# Patient Record
Sex: MALE | URBAN - METROPOLITAN AREA
[De-identification: names, ages, dates, MRNs, and addresses within clinical notes are randomized per-mention and may not be internally consistent; named-entity substitution may affect disease eponyms.]

---

## 2018-07-27 ENCOUNTER — OFFICE VISIT (OUTPATIENT)
Dept: URBAN - METROPOLITAN AREA CLINIC 48 | Facility: CLINIC | Age: 68
End: 2018-07-27
Payer: MEDICARE

## 2018-07-27 DIAGNOSIS — H43.11 VITREOUS HEMORRHAGE, RIGHT EYE: ICD-10-CM

## 2018-07-27 DIAGNOSIS — H40.52X0 GLAUCOMA OF LEFT EYE SECONDARY TO OTHER EYE DISORDER: ICD-10-CM

## 2018-07-27 PROCEDURE — 67028 INJECTION EYE DRUG: CPT | Performed by: OPHTHALMOLOGY

## 2018-07-27 PROCEDURE — 99213 OFFICE O/P EST LOW 20 MIN: CPT | Performed by: OPHTHALMOLOGY

## 2018-07-27 RX ORDER — LATANOPROST 50 UG/ML
0.005 % SOLUTION OPHTHALMIC
Qty: 1 | Refills: 2 | Status: INACTIVE
Start: 2018-07-27 | End: 2018-11-14

## 2018-07-27 RX ORDER — BRIMONIDINE TARTRATE, TIMOLOL MALEATE 2; 5 MG/ML; MG/ML
SOLUTION/ DROPS OPHTHALMIC
Qty: 1 | Refills: 2 | Status: INACTIVE
Start: 2018-07-27 | End: 2018-10-25

## 2018-07-27 ASSESSMENT — INTRAOCULAR PRESSURE
OD: 36
OS: 19

## 2018-07-27 NOTE — IMPRESSION/PLAN
Impression: Vitreous hemorrhage, right eye: H43.11. Plan: OCT ordered and performed today. Discussed diagnosis with patient. The clinical exam is consistent with Vitreous Hemorrhage. I have reviewed treatment options with the patient including observation vs Vitrectomy sx. After a through discussion of surgical R/B/A, the patient chose's to observe at this time. I discussed upright positioning and elevation of the head while sleeping. If the hemorrhage fails to spontaneously resolve or worsens, we will revisit the possibility of Vitrectomy at the follow up visit. Patient agrees with plan.

## 2018-07-27 NOTE — IMPRESSION/PLAN
Impression: Type 2 diabetes mellitus w/ proliferative diabetic retinopathy w/ macular edema, bilateral: P62.8221. Plan: OCT ordered and performed today. Discussed diagnosis in detail with patient. Discussed treatment options with patient. Discussed risks and benefits and patient understands. Start a one time injection in both eyes today. Discussed with patient possible laser in the left eye. Patient will see Dr. Will Barclay on Monday for NVG consult.

## 2018-07-27 NOTE — IMPRESSION/PLAN
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 NVG Plan: Discussed diagnosis in detail with patient. Discussed risks and benefits and patient understands. New medication(s) Rx given today. Consult recommended with Dr. Blair Broderick for NVG. Started patient on [place] and latanoprost, Rx sent to pharm.

## 2018-07-30 ENCOUNTER — OFFICE VISIT (OUTPATIENT)
Dept: URBAN - METROPOLITAN AREA CLINIC 48 | Facility: CLINIC | Age: 68
End: 2018-07-30
Payer: MEDICARE

## 2018-07-30 PROCEDURE — 76514 ECHO EXAM OF EYE THICKNESS: CPT | Performed by: OPHTHALMOLOGY

## 2018-07-30 PROCEDURE — 92014 COMPRE OPH EXAM EST PT 1/>: CPT | Performed by: OPHTHALMOLOGY

## 2018-07-30 PROCEDURE — 92020 GONIOSCOPY: CPT | Performed by: OPHTHALMOLOGY

## 2018-07-30 ASSESSMENT — INTRAOCULAR PRESSURE
OD: 37
OS: 13

## 2018-07-30 NOTE — IMPRESSION/PLAN
Impression: Other specified glaucoma: H40.89. Plan: Discussed diagnosis in detail with patient. Discussed treatment options with patient. IOP elevated in right eye. Discussed with patient that he needs to have good blood sugar control. Patient expressed understanding. Discussed with patient that he is to use pressure gtts to right eye not left eye. Patient to use Combigan bid od and Latanoprost qhs od. If IOP is not controlled with pressure drops then he might need a glaucoma surgery.  

RTC
this thursday

## 2018-08-02 ENCOUNTER — OFFICE VISIT (OUTPATIENT)
Dept: URBAN - METROPOLITAN AREA CLINIC 48 | Facility: CLINIC | Age: 68
End: 2018-08-02
Payer: MEDICARE

## 2018-08-02 PROCEDURE — 92012 INTRM OPH EXAM EST PATIENT: CPT | Performed by: OPHTHALMOLOGY

## 2018-08-02 RX ORDER — DORZOLAMIDE HCL 20 MG/ML
2 % SOLUTION/ DROPS OPHTHALMIC
Qty: 1 | Refills: 3 | Status: INACTIVE
Start: 2018-08-02 | End: 2018-10-25

## 2018-08-02 ASSESSMENT — INTRAOCULAR PRESSURE
OS: 11
OD: 27

## 2018-08-02 NOTE — IMPRESSION/PLAN
Impression: Other specified glaucoma: H40.89. Plan: IOP has improved since medication change, Medication instillation reviewed and understood. Change medication(s). Patient to start medications to lower ocular pressure, all potential side effects and benefits of Dorzolamide discussed. Patient made aware that failure to use this medication may result in Glaucoma progression. Patient to start Dorzolamide bid od, & continue with Combigan bid od and Latanoprost qhs od.

## 2018-08-08 ENCOUNTER — OFFICE VISIT (OUTPATIENT)
Dept: URBAN - METROPOLITAN AREA CLINIC 48 | Facility: CLINIC | Age: 68
End: 2018-08-08
Payer: MEDICARE

## 2018-08-08 PROCEDURE — 92014 COMPRE OPH EXAM EST PT 1/>: CPT | Performed by: OPHTHALMOLOGY

## 2018-08-08 RX ORDER — PREDNISOLONE ACETATE 10 MG/ML
1 % SUSPENSION/ DROPS OPHTHALMIC
Qty: 1 | Refills: 3 | Status: INACTIVE
Start: 2018-08-08 | End: 2018-10-25

## 2018-08-08 ASSESSMENT — INTRAOCULAR PRESSURE
OS: 15
OD: 15

## 2018-08-08 NOTE — IMPRESSION/PLAN
Impression: Other specified glaucoma: H40.89. Plan: IOP improved, continue current treatment. Add predforte bid OD for intraocular inflammation. IOP check in 1 week with Dr Francisco Goldsmith and then 3-4 weeks with me.

## 2018-08-15 ENCOUNTER — OFFICE VISIT (OUTPATIENT)
Dept: URBAN - METROPOLITAN AREA CLINIC 48 | Facility: CLINIC | Age: 68
End: 2018-08-15
Payer: MEDICARE

## 2018-08-15 PROCEDURE — 92012 INTRM OPH EXAM EST PATIENT: CPT | Performed by: OPHTHALMOLOGY

## 2018-08-15 ASSESSMENT — INTRAOCULAR PRESSURE
OS: 15
OD: 11

## 2018-08-15 NOTE — IMPRESSION/PLAN
Impression: Other specified glaucoma: H40.89. Plan: Patient to restart PF QD OD with punctal occlusion Continue  dorzolomide bid OD
- combigan bid OD
- latanoprost qhs OD If swelling continues and or symptoms get worse, neck swelling and tenderness patient needs to go to ER. Follow up with Dr. Bridget Leblanc on Monday. 

RTC 2 weeks IOP check Dr. Brianne Christian

## 2018-10-10 ENCOUNTER — OFFICE VISIT (OUTPATIENT)
Dept: URBAN - METROPOLITAN AREA CLINIC 48 | Facility: CLINIC | Age: 68
End: 2018-10-10
Payer: MEDICARE

## 2018-10-10 DIAGNOSIS — E11.3492 TYPE 2 DIAB W SEVERE NONPRLF DIAB RTNOP W/O MACULAR EDEMA, LEFT EYE: ICD-10-CM

## 2018-10-10 PROCEDURE — 92134 CPTRZ OPH DX IMG PST SGM RTA: CPT | Performed by: OPHTHALMOLOGY

## 2018-10-10 PROCEDURE — 99213 OFFICE O/P EST LOW 20 MIN: CPT | Performed by: OPHTHALMOLOGY

## 2018-10-10 ASSESSMENT — INTRAOCULAR PRESSURE
OS: 16
OD: 24

## 2018-10-10 NOTE — IMPRESSION/PLAN
Impression: Type 2 diab w severe nonprlf diab rtnop w/o macular edema, left eye: e11.3492. Plan: OCT ordered and performed today. Discussed diagnosis with patient. The clinical exam was consistent with Type 2 diabetes. The patient was advised to maintain tight blood sugar control, blood pressure and lipid control. Recommend patient follow up in 2-3 mths with [facility]. Patient was also advised to keep all appointments with PCP for diabetic evaluation and counseling to avoid the systemic complications of diabetes.

## 2018-10-10 NOTE — IMPRESSION/PLAN
Impression: Other specified glaucoma: H40.89. Plan: OCT ordered andperformed today, Discussed dx W/ pt, Recommend pt continue W/  latanoprost qhs OD Rx sent to pt's pharm, Recommend pt continue W/ Dr Ezio Solis as schd. Pt states he is confussed as to treatment and gtts. Advised pt to keep appts and to further discuss in detail W/ Dr Ezio Solis. Pt arees W/ plan

## 2018-10-25 ENCOUNTER — OFFICE VISIT (OUTPATIENT)
Dept: URBAN - METROPOLITAN AREA CLINIC 48 | Facility: CLINIC | Age: 68
End: 2018-10-25
Payer: MEDICARE

## 2018-10-25 PROCEDURE — 92012 INTRM OPH EXAM EST PATIENT: CPT | Performed by: OPHTHALMOLOGY

## 2018-10-25 ASSESSMENT — INTRAOCULAR PRESSURE
OD: 27
OS: 17

## 2018-10-25 NOTE — IMPRESSION/PLAN
Impression: Other specified glaucoma: H40.89. Plan: IOP elevated today to right eye, patient to switch Latanoprost from os to od today. Will continue to montior IOP, we may be adding some more eye drops if IOP is not controlled to right eye.

## 2018-11-14 ENCOUNTER — OFFICE VISIT (OUTPATIENT)
Dept: URBAN - METROPOLITAN AREA CLINIC 48 | Facility: CLINIC | Age: 68
End: 2018-11-14
Payer: MEDICARE

## 2018-11-14 PROCEDURE — 92012 INTRM OPH EXAM EST PATIENT: CPT | Performed by: OPHTHALMOLOGY

## 2018-11-14 RX ORDER — LATANOPROST 50 UG/ML
0.005 % SOLUTION OPHTHALMIC
Qty: 1 | Refills: 4 | Status: INACTIVE
Start: 2018-11-14 | End: 2019-02-13

## 2018-11-14 ASSESSMENT — INTRAOCULAR PRESSURE
OS: 17
OD: 20

## 2018-11-14 NOTE — IMPRESSION/PLAN
Impression: Other specified glaucoma: H40.89. Plan: IOP improved today to right eye, patient to continue Latanoprost qhs od. Will continue to montior IOP.

## 2018-12-14 ENCOUNTER — OFFICE VISIT (OUTPATIENT)
Dept: URBAN - METROPOLITAN AREA CLINIC 48 | Facility: CLINIC | Age: 68
End: 2018-12-14
Payer: MEDICARE

## 2018-12-14 DIAGNOSIS — H40.89 OTHER SPECIFIED GLAUCOMA: ICD-10-CM

## 2018-12-14 PROCEDURE — 99213 OFFICE O/P EST LOW 20 MIN: CPT | Performed by: OPHTHALMOLOGY

## 2018-12-14 PROCEDURE — 92134 CPTRZ OPH DX IMG PST SGM RTA: CPT | Performed by: OPHTHALMOLOGY

## 2018-12-14 PROCEDURE — 67028 INJECTION EYE DRUG: CPT | Performed by: OPHTHALMOLOGY

## 2018-12-14 ASSESSMENT — INTRAOCULAR PRESSURE
OD: 20
OS: 19

## 2018-12-14 NOTE — IMPRESSION/PLAN
Impression: Type 2 diab w moderate nonprlf diab rtnop w macular edema, left eye: L06.6067. OS. Plan: OCT ordered and performed today. Discussed diagnosis in detail with patient. Discussed treatment options with patient. Discussed risks and benefits and patient understands. Recommend Avastin OS,  A 3 month series of  Intirivitreal injection's, 1 EVERY MONTH  #1 today then #2 in 1 month, #3 in 2 month's, Then perform DE/OCT in 3 month's.

## 2018-12-14 NOTE — IMPRESSION/PLAN
Impression: Other specified glaucoma: H40.89. Plan: patient to continue Latanoprost qhs od.  Keep all appts W/ Dr Toshia Oconnor

## 2019-01-24 ENCOUNTER — OFFICE VISIT (OUTPATIENT)
Dept: URBAN - METROPOLITAN AREA CLINIC 48 | Facility: CLINIC | Age: 69
End: 2019-01-24
Payer: MEDICARE

## 2019-01-24 PROCEDURE — 99214 OFFICE O/P EST MOD 30 MIN: CPT | Performed by: OPHTHALMOLOGY

## 2019-01-24 PROCEDURE — 92134 CPTRZ OPH DX IMG PST SGM RTA: CPT | Performed by: OPHTHALMOLOGY

## 2019-01-24 PROCEDURE — 67028 INJECTION EYE DRUG: CPT | Performed by: OPHTHALMOLOGY

## 2019-01-24 ASSESSMENT — INTRAOCULAR PRESSURE
OD: 19
OS: 15

## 2019-01-24 NOTE — IMPRESSION/PLAN
Impression: Type 2 diabetes mellitus w/ proliferative diabetic retinopathy w/ macular edema, left eye: E11.3512 OS. Plan: Will proceed with #2/3 TIFFANIE today. R/B/A d/w pt. Consent obtained.  
F/u 4 wks w/Debeus for TIFFANIE #3/3

## 2019-02-13 ENCOUNTER — OFFICE VISIT (OUTPATIENT)
Dept: URBAN - METROPOLITAN AREA CLINIC 48 | Facility: CLINIC | Age: 69
End: 2019-02-13
Payer: MEDICARE

## 2019-02-13 PROCEDURE — 92012 INTRM OPH EXAM EST PATIENT: CPT | Performed by: OPHTHALMOLOGY

## 2019-02-13 RX ORDER — LATANOPROST 50 UG/ML
0.005 % SOLUTION OPHTHALMIC
Qty: 1 | Refills: 7 | Status: INACTIVE
Start: 2019-02-13 | End: 2019-04-30

## 2019-02-13 ASSESSMENT — INTRAOCULAR PRESSURE
OD: 21
OS: 18

## 2019-02-13 NOTE — IMPRESSION/PLAN
Impression: Other specified glaucoma: H40.89. Plan: Discussed and reviewed diagnosis with patient today, understood by patient, intraocular pressure stable with medication. Continue medications and observe. Importance of compliance with medications and regular follow-up reiterated, will continue to monitor. Patient to continue latanoprost qhs OD only. 

IOP check in 3 months VF 24-2/OCT ON same visit OS

## 2019-02-27 ENCOUNTER — PROCEDURE (OUTPATIENT)
Dept: URBAN - METROPOLITAN AREA CLINIC 48 | Facility: CLINIC | Age: 69
End: 2019-02-27
Payer: MEDICARE

## 2019-02-27 DIAGNOSIS — E11.3312 TYPE 2 DIAB WITH MOD NONP RTNOP WITH MACULAR EDEMA, L EYE: Primary | ICD-10-CM

## 2019-02-27 PROCEDURE — 67028 INJECTION EYE DRUG: CPT | Performed by: OPHTHALMOLOGY

## 2019-03-27 ENCOUNTER — OFFICE VISIT (OUTPATIENT)
Dept: URBAN - METROPOLITAN AREA CLINIC 48 | Facility: CLINIC | Age: 69
End: 2019-03-27
Payer: MEDICARE

## 2019-03-27 DIAGNOSIS — E11.3512 TYPE 2 DIABETES MELLITUS W/ PROLIFERATIVE DIABETIC RETINOPATHY W/ MACULAR EDEMA, LEFT EYE: ICD-10-CM

## 2019-03-27 PROCEDURE — 99213 OFFICE O/P EST LOW 20 MIN: CPT | Performed by: OPHTHALMOLOGY

## 2019-03-27 PROCEDURE — 92134 CPTRZ OPH DX IMG PST SGM RTA: CPT | Performed by: OPHTHALMOLOGY

## 2019-03-27 ASSESSMENT — INTRAOCULAR PRESSURE
OD: 24
OS: 14

## 2019-03-27 NOTE — IMPRESSION/PLAN
Impression: Corneal edema: H18.20. Plan: Discussed diagnosis in detail with patient. Discussed treatment options with patient.  Recommend appt W/ Dr. Morgan Estimable for corneal eval.

## 2019-03-27 NOTE — IMPRESSION/PLAN
Impression: Type 2 diabetes mellitus w/ proliferative diabetic retinopathy w/ macular edema, left eye: E11.3512 OS. Plan: Discussed diagnosis in detail with patient. No treatment is required at this time. Reassured patient of current condition and treatment. Will continue to observe condition and or symptoms. Emphasized blood sugar control. Discussed risks of progression.

## 2019-04-30 ENCOUNTER — OFFICE VISIT (OUTPATIENT)
Dept: URBAN - METROPOLITAN AREA CLINIC 48 | Facility: CLINIC | Age: 69
End: 2019-04-30
Payer: MEDICARE

## 2019-04-30 DIAGNOSIS — H18.20 CORNEAL EDEMA: Primary | ICD-10-CM

## 2019-04-30 PROCEDURE — 92012 INTRM OPH EXAM EST PATIENT: CPT | Performed by: OPHTHALMOLOGY

## 2019-04-30 RX ORDER — BRIMONIDINE TARTRATE 2 MG/ML
0.2 % SOLUTION/ DROPS OPHTHALMIC
Qty: 1 | Refills: 3 | Status: INACTIVE
Start: 2019-04-30 | End: 2020-03-20

## 2019-04-30 RX ORDER — PREDNISOLONE ACETATE 10 MG/ML
1 % SUSPENSION/ DROPS OPHTHALMIC
Qty: 1 | Refills: 3 | Status: INACTIVE
Start: 2019-04-30 | End: 2020-03-20

## 2019-04-30 ASSESSMENT — INTRAOCULAR PRESSURE
OS: 14
OD: 18

## 2019-04-30 NOTE — IMPRESSION/PLAN
Impression: Corneal edema: H18.20. Underlying KPs on endothelium only where the edema is at and [facility] reaction suggestive that the edema is secondary to inflammation. Given appearance do not suspect that this represents a direct auto-immune related K condition such as PUK, as there is no infiltrate and not ulceration. Plan: Discussed diagnosis in detail with patient. Restart Prednisolone QID OD [place] 128 5% ointment at night OD
stop Latanoprost do to may cause inflammation Start Brimonidine bid OD 

RTC 10 days  with IOP check and AC check. Photo to be taken today.

## 2019-06-03 ENCOUNTER — OFFICE VISIT (OUTPATIENT)
Dept: URBAN - METROPOLITAN AREA CLINIC 48 | Facility: CLINIC | Age: 69
End: 2019-06-03
Payer: MEDICARE

## 2019-06-03 DIAGNOSIS — E11.3392 TYPE 2 DIAB W MODERATE NONPRLF DIAB RTNOP W/O MACULAR EDEMA, LEFT EYE: ICD-10-CM

## 2019-06-03 DIAGNOSIS — H35.372 PUCKERING OF MACULA, LEFT EYE: ICD-10-CM

## 2019-06-03 PROCEDURE — 92083 EXTENDED VISUAL FIELD XM: CPT | Performed by: OPHTHALMOLOGY

## 2019-06-03 PROCEDURE — 92014 COMPRE OPH EXAM EST PT 1/>: CPT | Performed by: OPHTHALMOLOGY

## 2019-06-03 PROCEDURE — 92133 CPTRZD OPH DX IMG PST SGM ON: CPT | Performed by: OPHTHALMOLOGY

## 2019-06-03 ASSESSMENT — INTRAOCULAR PRESSURE
OD: 15
OS: 15

## 2019-06-03 NOTE — IMPRESSION/PLAN
Impression: Other specified glaucoma: H40.89. Plan: Discussed and reviewed diagnosis with patient today, understood by patient, discussed reviewed VF and OCT with patient today, intraocular pressure stable with medication. Continue medications and observe. Importance of compliance with medications and regular follow-up reiterated, will continue to monitor. Patient to continue Brimonidine bid od, & taper Pred forte 3x2x1 od then d/c.

## 2019-06-03 NOTE — IMPRESSION/PLAN
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 Plan: Diabetes type II: moderate background diabetic retinopathy, no signs of neovascularization noted. Discussed ocular and systemic benefits of maintaining blood sugar control. Patient to continue care with Dr Corrinne Oliva for diabetes.

## 2019-06-03 NOTE — IMPRESSION/PLAN
Impression: Combined forms of age-related cataract, left eye: H25.812. Plan: Progressive deterioration of vision in left eye. Has been getting intravitreal avastin and diabetic macular edema seems to have stabilized. Visual deterioration is likely secondary to progression of cataract and patient likely to benefit from cataract extraction. Patient understands risks of the surgery with worst case scenario of total loss of eye. Other pertinent risk is of reactivation of diabetic maculopathy that may require further treatment. Cataract surgery and the associated risks, benefits, alternatives, expectations, and recovery were discussed in detail with the patient. All questions were answered. The patient desires cataract surgery in left eye. RL 2  Patient desires standard lens for distance target. Patient is to start Combo drops Pred-Gati-Nepaf QID to operative eye one day prior to surgery, and continue after surgery. Schedule cataract surgery in left eye only.

## 2019-07-22 ENCOUNTER — TESTING ONLY (OUTPATIENT)
Dept: URBAN - METROPOLITAN AREA CLINIC 48 | Facility: CLINIC | Age: 69
End: 2019-07-22
Payer: MEDICARE

## 2019-07-22 DIAGNOSIS — H25.812 COMBINED FORMS OF AGE-RELATED CATARACT, LEFT EYE: Primary | ICD-10-CM

## 2019-07-22 PROCEDURE — 92136 OPHTHALMIC BIOMETRY: CPT | Performed by: OPHTHALMOLOGY

## 2019-07-22 ASSESSMENT — PACHYMETRY
OS: 3.10
OS: 23.89
OD: 4.15
OD: 23.72

## 2019-07-23 RX ORDER — KETOROLAC TROMETHAMINE 5 MG/ML
0.5 % SOLUTION OPHTHALMIC
Qty: 1 | Refills: 2 | Status: INACTIVE
Start: 2019-07-23 | End: 2020-03-20

## 2019-07-30 ENCOUNTER — SURGERY (OUTPATIENT)
Dept: URBAN - METROPOLITAN AREA SURGERY 26 | Facility: SURGERY | Age: 69
End: 2019-07-30
Payer: MEDICARE

## 2019-07-30 ENCOUNTER — POST-OPERATIVE VISIT (OUTPATIENT)
Dept: URBAN - METROPOLITAN AREA CLINIC 48 | Facility: CLINIC | Age: 69
End: 2019-07-30

## 2019-07-30 PROCEDURE — 66984 XCAPSL CTRC RMVL W/O ECP: CPT | Performed by: OPHTHALMOLOGY

## 2019-07-30 PROCEDURE — 99024 POSTOP FOLLOW-UP VISIT: CPT | Performed by: OPHTHALMOLOGY

## 2019-07-30 ASSESSMENT — INTRAOCULAR PRESSURE
OS: 22
OS: 22

## 2019-08-06 ENCOUNTER — POST-OPERATIVE VISIT (OUTPATIENT)
Dept: URBAN - METROPOLITAN AREA CLINIC 48 | Facility: CLINIC | Age: 69
End: 2019-08-06
Payer: MEDICARE

## 2019-08-06 PROCEDURE — 99024 POSTOP FOLLOW-UP VISIT: CPT | Performed by: OPTOMETRIST

## 2019-08-06 ASSESSMENT — INTRAOCULAR PRESSURE: OS: 16

## 2019-09-17 ENCOUNTER — POST-OPERATIVE VISIT (OUTPATIENT)
Dept: URBAN - METROPOLITAN AREA CLINIC 48 | Facility: CLINIC | Age: 69
End: 2019-09-17
Payer: MEDICARE

## 2019-09-17 PROCEDURE — 99024 POSTOP FOLLOW-UP VISIT: CPT | Performed by: OPTOMETRIST

## 2019-09-17 ASSESSMENT — INTRAOCULAR PRESSURE
OS: 12
OD: 22

## 2019-09-18 ENCOUNTER — OFFICE VISIT (OUTPATIENT)
Dept: URBAN - METROPOLITAN AREA CLINIC 48 | Facility: CLINIC | Age: 69
End: 2019-09-18
Payer: MEDICARE

## 2019-09-18 DIAGNOSIS — E11.3591 TYPE 2 DIABETES MELLITUS W/ PROLIFERATIVE DIABETIC RETINOPATHY W/O MACULAR EDEMA, RIGHT EYE: ICD-10-CM

## 2019-09-18 PROCEDURE — 92134 CPTRZ OPH DX IMG PST SGM RTA: CPT | Performed by: OPHTHALMOLOGY

## 2019-09-18 PROCEDURE — 99213 OFFICE O/P EST LOW 20 MIN: CPT | Performed by: OPHTHALMOLOGY

## 2019-09-18 PROCEDURE — 67028 INJECTION EYE DRUG: CPT | Performed by: OPHTHALMOLOGY

## 2019-09-18 ASSESSMENT — INTRAOCULAR PRESSURE
OD: 32
OS: 19

## 2019-09-18 NOTE — IMPRESSION/PLAN
Impression: Type 2 diabetes mellitus w/ proliferative diabetic retinopathy w/o macular edema, right eye: e11.3591. OD. Plan: OCT ordered and performed today. The clinical exam is consistent with proliferative diabetic retinopathy. Discussed diagnosis with patient. Recommend close observation at this time.

## 2019-09-18 NOTE — IMPRESSION/PLAN
Impression: Other specified glaucoma: H40.89. OU. Plan: Recommend starting Latanoprost QHS OU. Discussed with patient the IOP's were elevated today.

## 2019-09-18 NOTE — IMPRESSION/PLAN
Impression: Type 2 diab w severe nonprlf diabetic rtnop w macular edema, left eye: F97.3220. OS. Plan: OCT ordered and performed today. Discussed diagnosis in detail with patient. Discussed treatment options with patient. Discussed risks and benefits and patient understands. A 3 month series of Avastin Intravitreal injection's in the left eye, 1 EVERY MONTH  #1 today then #2 in 1 month, #3 in 2 month's, Then perform DE/OCT in 3 month's.

## 2019-10-18 ENCOUNTER — PROCEDURE (OUTPATIENT)
Dept: URBAN - METROPOLITAN AREA CLINIC 48 | Facility: CLINIC | Age: 69
End: 2019-10-18
Payer: MEDICARE

## 2019-10-18 DIAGNOSIS — E11.3412 TYPE 2 DIAB WITH SEVERE NONP RTNOP WITH MACULAR EDEMA, L EYE: Primary | ICD-10-CM

## 2019-10-18 PROCEDURE — 67028 INJECTION EYE DRUG: CPT | Performed by: OPHTHALMOLOGY

## 2019-11-15 ENCOUNTER — PROCEDURE (OUTPATIENT)
Dept: URBAN - METROPOLITAN AREA CLINIC 48 | Facility: CLINIC | Age: 69
End: 2019-11-15
Payer: MEDICARE

## 2019-11-15 PROCEDURE — 67028 INJECTION EYE DRUG: CPT | Performed by: OPHTHALMOLOGY

## 2020-03-20 ENCOUNTER — OFFICE VISIT (OUTPATIENT)
Dept: URBAN - METROPOLITAN AREA CLINIC 48 | Facility: CLINIC | Age: 70
End: 2020-03-20
Payer: MEDICARE

## 2020-03-20 DIAGNOSIS — E11.3513 TYPE 2 DIABETES MELLITUS W/ PROLIFERATIVE DIABETIC RETINOPATHY W/ MACULAR EDEMA, BILATERAL: Primary | ICD-10-CM

## 2020-03-20 PROCEDURE — 92134 CPTRZ OPH DX IMG PST SGM RTA: CPT | Performed by: OPHTHALMOLOGY

## 2020-03-20 PROCEDURE — 99213 OFFICE O/P EST LOW 20 MIN: CPT | Performed by: OPHTHALMOLOGY

## 2020-03-20 RX ORDER — LATANOPROST 50 UG/ML
0.005 % SOLUTION OPHTHALMIC
Qty: 2.5 | Refills: 4 | Status: ACTIVE
Start: 2020-03-20

## 2020-03-20 RX ORDER — BRIMONIDINE TARTRATE, TIMOLOL MALEATE 2; 5 MG/ML; MG/ML
SOLUTION/ DROPS OPHTHALMIC
Qty: 1 | Refills: 4 | Status: INACTIVE
Start: 2020-03-20 | End: 2020-03-20

## 2020-03-20 RX ORDER — BRIMONIDINE TARTRATE, TIMOLOL MALEATE 2; 5 MG/ML; MG/ML
SOLUTION/ DROPS OPHTHALMIC
Qty: 10 | Refills: 4 | Status: ACTIVE
Start: 2020-03-20

## 2020-03-20 RX ORDER — PREDNISOLONE ACETATE 10 MG/ML
1 % SUSPENSION/ DROPS OPHTHALMIC
Qty: 5 | Refills: 2 | Status: INACTIVE
Start: 2020-03-20 | End: 2020-03-20

## 2020-03-20 RX ORDER — LATANOPROST 50 UG/ML
0.005 % SOLUTION OPHTHALMIC
Qty: 1 | Refills: 2 | Status: INACTIVE
Start: 2020-03-20 | End: 2020-03-20

## 2020-03-20 RX ORDER — OFLOXACIN 3 MG/ML
0.3 % SOLUTION/ DROPS OPHTHALMIC
Qty: 5 | Refills: 0 | Status: INACTIVE
Start: 2020-03-20 | End: 2020-03-20

## 2020-03-20 RX ORDER — DORZOLAMIDE HCL 20 MG/ML
2 % SOLUTION/ DROPS OPHTHALMIC
Qty: 10 | Refills: 4 | Status: ACTIVE
Start: 2020-03-20

## 2020-03-20 ASSESSMENT — INTRAOCULAR PRESSURE
OS: 37
OD: 32

## 2020-03-20 NOTE — IMPRESSION/PLAN
Impression: Other specified glaucoma: H40.89 OU. Plan: Uncontrolled IOP in both eyes. Recommend tube aqueous shunt surgery +/- scleral reinforcement for further lowering of intraocular pressure to left eye to right eye well re-evaluate in future, possibility of using one of three shunts, [place]ever[place]t, [place]o and Ahmed. Risks and benefits of the surgery explained. Patient understands that the purpose of the surgery is to lower eye pressure and not to improve vision. Patient asked to consult with PCP or cardiologist and stop antiplatelet or anticoagulant medications 7 days prior to the surgery date if medically appropriate. Patient will also start on Dorzolamide bid ou. Patient is to start Prednisolone QID and Ofloxacin QID times a day one day prior to surgery. All potential side effects and benefits of medication discussed. Patient to bring in all drops to PO visits. 

Schedule Tube shunt surgery+/- scleral reinforcement in left eye only, RL 2 w/ Dr Patti Stone on Wednesday

## 2020-03-20 NOTE — IMPRESSION/PLAN
Impression: Other specified glaucoma: H40.89. OU. Plan: Recommend continue Latanoprost QHS OU and start Combigan BID OU. Dr. Aaron Levin will schedule patient for emergency surgery.

## 2020-03-20 NOTE — IMPRESSION/PLAN
Impression: Type 2 diabetes mellitus w/ proliferative diabetic retinopathy w/ macular edema, bilateral: O85.6795. OU. Plan: OCT ordered and performed today. Discussed diagnosis in detail with patient. Discussed treatment options with patient. Discussed risks and benefits and patient understands. Recommend a one time Avastin intravitreal injection today in both eyes. Patient is to consult with Dr. Con Campbell for elevated IOP's.  Recommend start Combigan BID OU and Latanoprost qhs OU

## 2020-03-25 ENCOUNTER — SURGERY (OUTPATIENT)
Dept: URBAN - METROPOLITAN AREA SURGERY 26 | Facility: SURGERY | Age: 70
End: 2020-03-25
Payer: MEDICARE

## 2020-03-25 PROCEDURE — 66180 AQUEOUS SHUNT EYE W/GRAFT: CPT | Performed by: OPHTHALMOLOGY

## 2020-03-26 ENCOUNTER — POST-OPERATIVE VISIT (OUTPATIENT)
Dept: URBAN - METROPOLITAN AREA CLINIC 48 | Facility: CLINIC | Age: 70
End: 2020-03-26

## 2020-03-26 PROCEDURE — 99024 POSTOP FOLLOW-UP VISIT: CPT | Performed by: OPHTHALMOLOGY

## 2020-03-26 ASSESSMENT — INTRAOCULAR PRESSURE
OS: 8
OS: 11

## 2020-04-01 ENCOUNTER — POST-OPERATIVE VISIT (OUTPATIENT)
Dept: URBAN - METROPOLITAN AREA CLINIC 48 | Facility: CLINIC | Age: 70
End: 2020-04-01

## 2020-04-01 PROCEDURE — 99024 POSTOP FOLLOW-UP VISIT: CPT | Performed by: OPHTHALMOLOGY

## 2020-04-01 RX ORDER — PREDNISONE 20 MG/1
20 MG TABLET ORAL
Qty: 12 | Refills: 1 | Status: INACTIVE
Start: 2020-04-01 | End: 2020-04-01

## 2020-04-01 ASSESSMENT — INTRAOCULAR PRESSURE: OS: 3

## 2020-04-06 ENCOUNTER — POST-OPERATIVE VISIT (OUTPATIENT)
Dept: URBAN - METROPOLITAN AREA CLINIC 48 | Facility: CLINIC | Age: 70
End: 2020-04-06
Payer: MEDICARE

## 2020-04-06 PROCEDURE — 99024 POSTOP FOLLOW-UP VISIT: CPT | Performed by: OPHTHALMOLOGY

## 2020-04-06 ASSESSMENT — INTRAOCULAR PRESSURE
OD: 29
OS: 9

## 2020-04-13 ENCOUNTER — POST-OPERATIVE VISIT (OUTPATIENT)
Dept: URBAN - METROPOLITAN AREA CLINIC 48 | Facility: CLINIC | Age: 70
End: 2020-04-13
Payer: MEDICARE

## 2020-04-13 DIAGNOSIS — Z09 ENCNTR FOR F/U EXAM AFT TRTMT FOR COND OTH THAN MALIG NEOPLM: Primary | ICD-10-CM

## 2020-04-13 PROCEDURE — 99024 POSTOP FOLLOW-UP VISIT: CPT | Performed by: OPHTHALMOLOGY

## 2020-04-13 ASSESSMENT — INTRAOCULAR PRESSURE
OD: 30
OS: 22

## 2020-04-13 NOTE — IMPRESSION/PLAN
Impression: Other specified glaucoma: H40.89. Plan: Patient is on maximum therapy. IOP > than goal and no visual potential after laser treatment, offer laser Cyclo photocoagulation. Surgical risks and benefits were discussed, explained and understood by patient. Patient elects to have laser to lower IOP. Recommend Cyclo photocoagulation P3 to right eye only , RL 2

## 2020-04-21 ENCOUNTER — POST-OPERATIVE VISIT (OUTPATIENT)
Dept: URBAN - METROPOLITAN AREA CLINIC 48 | Facility: CLINIC | Age: 70
End: 2020-04-21

## 2020-04-21 PROCEDURE — 99024 POSTOP FOLLOW-UP VISIT: CPT | Performed by: OPHTHALMOLOGY

## 2020-04-21 ASSESSMENT — INTRAOCULAR PRESSURE
OS: 22
OD: 26